# Patient Record
Sex: MALE | Race: WHITE
[De-identification: names, ages, dates, MRNs, and addresses within clinical notes are randomized per-mention and may not be internally consistent; named-entity substitution may affect disease eponyms.]

---

## 2017-12-04 ENCOUNTER — HOSPITAL ENCOUNTER (EMERGENCY)
Dept: HOSPITAL 76 - ED | Age: 22
Discharge: HOME | End: 2017-12-04
Payer: COMMERCIAL

## 2017-12-04 VITALS — DIASTOLIC BLOOD PRESSURE: 86 MMHG | SYSTOLIC BLOOD PRESSURE: 123 MMHG

## 2017-12-04 DIAGNOSIS — M25.511: Primary | ICD-10-CM

## 2017-12-04 PROCEDURE — 99282 EMERGENCY DEPT VISIT SF MDM: CPT

## 2017-12-04 PROCEDURE — 99283 EMERGENCY DEPT VISIT LOW MDM: CPT

## 2017-12-04 NOTE — ED PHYSICIAN DOCUMENTATION
History of Present Illness





- Stated complaint


Stated Complaint: SHOULDER PX





- Chief complaint


Chief Complaint: Ext Problem





- Additonal information


Additional information: 





22-year-old male presents to the emergency department with ongoing right 

shoulder pain.He reports that he has had x-rays done twice.  He is frustrated 

that he has not received more treatment or testing for his arm the his primary 

physician through the  and Was hoping that the emergency department 

could help him get further imaging treatment such as MRI.





He has full range of motion of his right shoulder, his pain is mostly anterior 

and superior at the shoulder joint.  He reports that he will have intermittent 

clicking with range of motion.He reports that recently he was doing 25 push-ups 

and at the final push-up his right shoulder simply gave out.





Review of Systems


Neurologic: denies: Focal weakness, Numbness





PD PAST MEDICAL HISTORY





- Present Medications


Home Medications: 


 Ambulatory Orders











 Medication  Instructions  Recorded  Confirmed


 


Meloxicam 15 mg PO DAILY 12/04/17 12/04/17














- Allergies


Allergies/Adverse Reactions: 


 Allergies











Allergy/AdvReac Type Severity Reaction Status Date / Time


 


No Known Drug Allergies Allergy   Verified 12/04/17 07:24














PD ED PE NORMAL





- General


General: Alert and oriented X 3





- HEENT


HEENT: Atraumatic





- Neck


Neck: Supple, no meningeal sign





- Cardiac


Cardiac: Strong equal pulses





- Respiratory


Respiratory: No respiratory distress





PD ED PE EXPANDED





- Extremities


Extremities: Right shoulder (Full range of motion Of shoulder.  Mild tenderness 

to superior and anterior shoulder joint. No appreciable weakness on shoulder 

abduction, external rotation, internal rotation extension or flexion.No 

deformities.)





Results





- Vitals


Vitals: 





 Vital Signs - 24 hr











  12/04/17





  07:25


 


Heart Rate 81


 


Respiratory 14





Rate 


 


Blood Pressure 123/86 H


 


O2 Saturation 98








 Oxygen











O2 Source                      Room air

















PD MEDICAL DECISION MAKING





- ED course


ED course: 





22-year-old male presents to the emergency department for second opinion on 

chronic shoulder pain.His rotator cuff seems grossly intact, but I did discuss 

with him possibility of small chronic rotator cuff injury, tendinitis, 

osteoarthritis, Bursitis. I did recommend to him that he follow-up with sports 

medicine or orthopedics and continue to attempt to obtain further imaging of 

the shoulder.Did not perform plain film x-rays today given no suspicion for 

bony injury and previously performed x 2. 





Departure





- Departure


Disposition: 01 Home, Self Care


Clinical Impression: 


 Shoulder pain, right





Condition: Good


Instructions:  Instability Shoulder, Rotator Cuff Injury, Anatomy Shoulder, 

Shoulder Probs


Comments: 


You should see a sports medicine physician or a orthopedist regarding your 

shoulder pain. You should have an MRI to further evaluate your shoulder. 





Return to the emergency department if he develops severe pain, weakness or 

numbness.

## 2018-04-03 ENCOUNTER — HOSPITAL ENCOUNTER (OUTPATIENT)
Dept: HOSPITAL 76 - SC | Age: 23
Discharge: HOME | End: 2018-04-03
Attending: INTERNAL MEDICINE
Payer: COMMERCIAL

## 2018-04-03 DIAGNOSIS — G47.10: Primary | ICD-10-CM

## 2018-04-03 PROCEDURE — 99212 OFFICE O/P EST SF 10 MIN: CPT

## 2018-04-03 PROCEDURE — 99203 OFFICE O/P NEW LOW 30 MIN: CPT

## 2018-05-29 ENCOUNTER — HOSPITAL ENCOUNTER (OUTPATIENT)
Dept: HOSPITAL 76 - SC | Age: 23
Discharge: HOME | End: 2018-05-29
Attending: INTERNAL MEDICINE
Payer: COMMERCIAL

## 2018-05-29 DIAGNOSIS — G47.61: Primary | ICD-10-CM

## 2018-05-29 PROCEDURE — 95810 POLYSOM 6/> YRS 4/> PARAM: CPT

## 2018-05-30 ENCOUNTER — HOSPITAL ENCOUNTER (OUTPATIENT)
Dept: HOSPITAL 76 - SC | Age: 23
Discharge: HOME | End: 2018-05-30
Attending: INTERNAL MEDICINE
Payer: COMMERCIAL

## 2018-05-30 ENCOUNTER — HOSPITAL ENCOUNTER (OUTPATIENT)
Dept: HOSPITAL 76 - LAB.R | Age: 23
End: 2018-05-30
Attending: INTERNAL MEDICINE
Payer: COMMERCIAL

## 2018-05-30 DIAGNOSIS — G47.10: Primary | ICD-10-CM

## 2018-05-30 LAB
AMPHET UR QL SCN: NEGATIVE
BENZODIAZ UR QL SCN: NEGATIVE
COCAINE UR-SCNC: NEGATIVE UMOL/L
METHADONE UR QL SCN: NEGATIVE
METHAMPHET UR QL SCN: NEGATIVE
OPIATES UR QL SCN: NEGATIVE
VOLATILE DRUGS POS SERPL SCN: (no result)

## 2018-05-30 PROCEDURE — 95805 MULTIPLE SLEEP LATENCY TEST: CPT

## 2018-05-30 PROCEDURE — 80306 DRUG TEST PRSMV INSTRMNT: CPT

## 2018-06-25 ENCOUNTER — HOSPITAL ENCOUNTER (OUTPATIENT)
Dept: HOSPITAL 76 - SC | Age: 23
Discharge: HOME | End: 2018-06-25
Attending: INTERNAL MEDICINE
Payer: COMMERCIAL

## 2018-06-25 DIAGNOSIS — G47.10: Primary | ICD-10-CM

## 2018-06-25 PROCEDURE — 99213 OFFICE O/P EST LOW 20 MIN: CPT

## 2018-06-25 PROCEDURE — 99212 OFFICE O/P EST SF 10 MIN: CPT
